# Patient Record
Sex: MALE | Race: WHITE | URBAN - METROPOLITAN AREA
[De-identification: names, ages, dates, MRNs, and addresses within clinical notes are randomized per-mention and may not be internally consistent; named-entity substitution may affect disease eponyms.]

---

## 2022-08-10 ENCOUNTER — HOSPITAL ENCOUNTER (EMERGENCY)
Facility: HOSPITAL | Age: 32
Discharge: HOME | End: 2022-08-10
Attending: STUDENT IN AN ORGANIZED HEALTH CARE EDUCATION/TRAINING PROGRAM
Payer: MEDICAID

## 2022-08-10 VITALS
HEART RATE: 94 BPM | BODY MASS INDEX: 19.71 KG/M2 | TEMPERATURE: 100 F | RESPIRATION RATE: 16 BRPM | WEIGHT: 130.06 LBS | DIASTOLIC BLOOD PRESSURE: 80 MMHG | HEIGHT: 68 IN | OXYGEN SATURATION: 98 % | SYSTOLIC BLOOD PRESSURE: 135 MMHG

## 2022-08-10 DIAGNOSIS — U07.1 COVID-19: Primary | ICD-10-CM

## 2022-08-10 LAB
C TRACH DNA SPEC QL NAA+PROBE: NOT DETECTED
FLUAV AG UPPER RESP QL IA.RAPID: NOT DETECTED
FLUBV AG UPPER RESP QL IA.RAPID: NOT DETECTED
HIV 1+2 AB+HIV1 P24 AG SERPL QL IA: NONREACTIVE
N GONORRHOEA DNA SPEC QL NAA+PROBE: NOT DETECTED
SARS-COV-2 RNA RESP QL NAA+PROBE: DETECTED
T PALLIDUM AB SER QL: NONREACTIVE

## 2022-08-10 PROCEDURE — 87636 SARSCOV2 & INF A&B AMP PRB: CPT | Performed by: PHYSICIAN ASSISTANT

## 2022-08-10 PROCEDURE — 87591 N.GONORRHOEAE DNA AMP PROB: CPT | Performed by: PHYSICIAN ASSISTANT

## 2022-08-10 PROCEDURE — 86780 TREPONEMA PALLIDUM: CPT | Performed by: PHYSICIAN ASSISTANT

## 2022-08-10 PROCEDURE — 87389 HIV-1 AG W/HIV-1&-2 AB AG IA: CPT | Performed by: PHYSICIAN ASSISTANT

## 2022-08-10 PROCEDURE — 63600175 PHARM REV CODE 636 W HCPCS: Performed by: PHYSICIAN ASSISTANT

## 2022-08-10 PROCEDURE — 99284 EMERGENCY DEPT VISIT MOD MDM: CPT | Mod: 25

## 2022-08-10 PROCEDURE — 87491 CHLMYD TRACH DNA AMP PROBE: CPT | Performed by: PHYSICIAN ASSISTANT

## 2022-08-10 PROCEDURE — 96372 THER/PROPH/DIAG INJ SC/IM: CPT | Performed by: PHYSICIAN ASSISTANT

## 2022-08-10 PROCEDURE — 36415 COLL VENOUS BLD VENIPUNCTURE: CPT | Performed by: PHYSICIAN ASSISTANT

## 2022-08-10 RX ORDER — INDOMETHACIN 50 MG/1
50 CAPSULE ORAL
Qty: 15 CAPSULE | Refills: 0 | Status: SHIPPED | OUTPATIENT
Start: 2022-08-10

## 2022-08-10 RX ORDER — KETOROLAC TROMETHAMINE 30 MG/ML
30 INJECTION, SOLUTION INTRAMUSCULAR; INTRAVENOUS
Status: COMPLETED | OUTPATIENT
Start: 2022-08-10 | End: 2022-08-10

## 2022-08-10 RX ORDER — PROMETHAZINE HYDROCHLORIDE AND DEXTROMETHORPHAN HYDROBROMIDE 6.25; 15 MG/5ML; MG/5ML
5 SYRUP ORAL EVERY 6 HOURS PRN
Qty: 100 ML | Refills: 0 | Status: SHIPPED | OUTPATIENT
Start: 2022-08-10 | End: 2022-08-15

## 2022-08-10 RX ADMIN — KETOROLAC TROMETHAMINE 30 MG: 30 INJECTION, SOLUTION INTRAMUSCULAR at 02:08

## 2022-08-10 NOTE — Clinical Note
"Carmine"Diana Mclean was seen and treated in our emergency department on 8/10/2022.     COVID-19 is present in our communities across the state. There is limited testing for COVID at this time, so not all patients can be tested. In this situation, your employee meets the following criteria:    Carmine Mclean has met the criteria for COVID-19 testing and has a POSITIVE result. He can return to work once they are asymptomatic for 24 hours without the use of fever reducing medications AND at least five days from the first positive result. A mask is recommended for 5 days post quarantine.     If you have any questions or concerns, or if I can be of further assistance, please do not hesitate to contact me.    Sincerely,              RN"

## 2022-08-10 NOTE — DISCHARGE INSTRUCTIONS
Report to Emergency Department if symptoms return or worsen; [facility] Within 1 to 2 days, It is important that you follow up with your primary care provider or specialist if indicated for further evaluation, workup, and treatment as necessary. The exam and treatment you received in Emergency Department was for an urgent problem and NOT INTENDED AS COMPLETE CARE. It is important that you FOLLOW UP with a doctor for ongoing care. If your symptoms become WORSE or you DO NOT IMPROVE and you are unable to reach your health care provider, you should RETURN to the Emergency Department. The Emergency Department provider has provided a PRELIMINARY INTERPRETATION of all your studies. A final interpretation may be done after you are discharged. If a change in your diagnosis or treatment is needed WE WILL CONTACT YOU. It is critical that we have a CURRENT PHONE NUMBER FOR YOU.

## 2022-08-10 NOTE — ED PROVIDER NOTES
Encounter Date: 8/10/2022       History     Chief Complaint   Patient presents with    COVID-19 Concerns     Reports cough, body aches, and clammy at home since yesterday.      33 yo M presents to ED c/o cough, body aches, fatigue & chills for past 2 days. Patient reports his father has similar symptoms & tested positive for COVID yesterday. Patient also requesting STD testing. Denies known STD exposure or symptoms. VSS on arrival w/ NAD.        Review of patient's allergies indicates:   Allergen Reactions    Acetaminophen      elevated liver enzymes    Bee venom protein (honey bee)      History reviewed. No pertinent past medical history.  Past Surgical History:   Procedure Laterality Date    TONSILLECTOMY       History reviewed. No pertinent family history.  Social History     Tobacco Use    Smoking status: Never Smoker    Smokeless tobacco: Never Used   Substance Use Topics    Alcohol use: Yes    Drug use: Never     Review of Systems   Constitutional: Positive for appetite change, chills and fatigue. Negative for diaphoresis, fever and unexpected weight change.   HENT: Positive for congestion, rhinorrhea and sinus pressure. Negative for ear discharge, ear pain and sinus pain.    Eyes: Negative for photophobia and visual disturbance.   Respiratory: Positive for cough. Negative for shortness of breath, wheezing and stridor.    Cardiovascular: Negative for chest pain and palpitations.   Gastrointestinal: Negative for abdominal pain, diarrhea, nausea and vomiting.   Endocrine: Negative for polydipsia, polyphagia and polyuria.   Genitourinary: Negative for dysuria, frequency, genital sores, hematuria, penile discharge, penile pain, scrotal swelling, testicular pain and urgency.   Musculoskeletal: Positive for arthralgias and myalgias. Negative for back pain, joint swelling, neck pain and neck stiffness.   Skin: Negative for pallor and rash.   Neurological: Negative for dizziness, syncope, weakness,  light-headedness, numbness and headaches.   Hematological: Negative for adenopathy.   Psychiatric/Behavioral: Negative for confusion.   All other systems reviewed and are negative.      Physical Exam     Initial Vitals [08/10/22 1218]   BP Pulse Resp Temp SpO2   135/80 94 16 100.2 °F (37.9 °C) 98 %      MAP       --         Physical Exam    Nursing note and vitals reviewed.  Constitutional: He appears well-developed and well-nourished. He is not diaphoretic. No distress.   HENT:   Head: Normocephalic and atraumatic.   Mouth/Throat: Oropharynx is clear and moist. No oropharyngeal exudate.   Eyes: Conjunctivae are normal. Pupils are equal, round, and reactive to light.   Neck: Neck supple.   Normal range of motion.  Cardiovascular: Normal rate, regular rhythm, normal heart sounds and intact distal pulses. Exam reveals no gallop and no friction rub.    No murmur heard.  Pulmonary/Chest: Breath sounds normal. No respiratory distress. He has no wheezes. He has no rhonchi. He has no rales.   Abdominal: Abdomen is soft. Bowel sounds are normal. He exhibits no distension. There is no abdominal tenderness. There is no rebound and no guarding.   Musculoskeletal:         General: No tenderness or edema. Normal range of motion.      Cervical back: Normal range of motion and neck supple.     Neurological: He is alert and oriented to person, place, and time. No cranial nerve deficit or sensory deficit.   Skin: Skin is warm and dry. Capillary refill takes less than 2 seconds. No erythema. No pallor.   Psychiatric: He has a normal mood and affect.         ED Course   Procedures  Labs Reviewed   COVID/FLU A&B PCR - Abnormal; Notable for the following components:       Result Value    SARS-CoV-2 PCR Detected (*)     All other components within normal limits   CHLAMYDIA/GONORRHOEAE(GC), PCR   SYPHILIS ANTIBODY (WITH REFLEX RPR)   HIV 1 / 2 ANTIBODY          Imaging Results    None          Medications   ketorolac injection 30 mg (30  mg Intramuscular Given 8/10/22 5303)     Medical Decision Making:   Clinical Tests:   Lab Tests: Ordered and Reviewed  COVID positive. Will discharge w/ meds for symptom relief. Instructed to check portal for results of STD testing & return to ED if any tests are positive. Strict ED precautions given.                      Clinical Impression:   Final diagnoses:  [U07.1] COVID-19 (Primary)          ED Disposition Condition    Discharge Stable        ED Prescriptions     Medication Sig Dispense Start Date End Date Auth. Provider    indomethacin (INDOCIN) 50 MG capsule Take 1 capsule (50 mg total) by mouth 3 (three) times daily with meals. 15 capsule 8/10/2022  WALT Zelaya    promethazine-dextromethorphan (PROMETHAZINE-DM) 6.25-15 mg/5 mL Syrp Take 5 mLs by mouth every 6 (six) hours as needed (cough). 100 mL 8/10/2022 8/15/2022 WALT Zelaya        Follow-up Information     Follow up With Specialties Details Why Contact Info Additional Information    [facility] Internal Medicine In 4 weeks  [address] [facility] [facility] [address] [phone] Internal Medicine Clinic Entrance #1    [facility] Emergency [facility] Emergency Medicine  If symptoms worsen [address] [facility] [address] [phone]            WALT Zelaya  08/10/22 2870

## 2022-08-10 NOTE — Clinical Note
"Carmine"Diana Mclean was seen and treated in our emergency department on 8/10/2022.     COVID-19 is present in our communities across the state. There is limited testing for COVID at this time, so not all patients can be tested. In this situation, your employee meets the following criteria:    Carmine Mclean has met the criteria for COVID-19 testing and has a POSITIVE result. He can return to work once they are asymptomatic for 24 hours without the use of fever reducing medications AND at least five days from the first positive result. A mask is recommended for 5 days post quarantine.     If you have any questions or concerns, or if I can be of further assistance, please do not hesitate to contact me.    Sincerely,             WALT Zelaya"

## 2023-06-23 ENCOUNTER — HOSPITAL ENCOUNTER (EMERGENCY)
Facility: HOSPITAL | Age: 33
Discharge: PSYCHIATRIC OR SUBSTANCE USE FACILITY | End: 2023-06-23
Attending: EMERGENCY MEDICINE
Payer: MEDICAID

## 2023-06-23 VITALS
WEIGHT: 132.25 LBS | OXYGEN SATURATION: 100 % | BODY MASS INDEX: 20.04 KG/M2 | SYSTOLIC BLOOD PRESSURE: 158 MMHG | TEMPERATURE: 98 F | HEART RATE: 60 BPM | RESPIRATION RATE: 18 BRPM | DIASTOLIC BLOOD PRESSURE: 98 MMHG | HEIGHT: 68 IN

## 2023-06-23 DIAGNOSIS — F32.A DEPRESSION WITH SUICIDAL IDEATION: ICD-10-CM

## 2023-06-23 DIAGNOSIS — R44.0 AUDITORY HALLUCINATIONS: ICD-10-CM

## 2023-06-23 DIAGNOSIS — R45.851 DEPRESSION WITH SUICIDAL IDEATION: ICD-10-CM

## 2023-06-23 DIAGNOSIS — Z00.8 MEDICAL CLEARANCE FOR PSYCHIATRIC ADMISSION: Primary | ICD-10-CM

## 2023-06-23 LAB
ALBUMIN SERPL-MCNC: 4.5 G/DL (ref 3.5–5)
ALBUMIN/GLOB SERPL: 1.6 RATIO (ref 1.1–2)
ALP SERPL-CCNC: 59 UNIT/L (ref 40–150)
ALT SERPL-CCNC: 27 UNIT/L (ref 0–55)
AMPHET UR QL SCN: NEGATIVE
APAP SERPL-MCNC: <17.4 UG/ML (ref 17.4–30)
APPEARANCE UR: CLEAR
AST SERPL-CCNC: 26 UNIT/L (ref 5–34)
BACTERIA #/AREA URNS AUTO: ABNORMAL /HPF
BARBITURATE SCN PRESENT UR: NEGATIVE
BASOPHILS # BLD AUTO: 0.07 X10(3)/MCL
BASOPHILS NFR BLD AUTO: 0.9 %
BENZODIAZ UR QL SCN: NEGATIVE
BILIRUB UR QL STRIP.AUTO: NEGATIVE MG/DL
BILIRUBIN DIRECT+TOT PNL SERPL-MCNC: 0.8 MG/DL
BUN SERPL-MCNC: 17.9 MG/DL (ref 8.9–20.6)
CALCIUM SERPL-MCNC: 9.7 MG/DL (ref 8.4–10.2)
CANNABINOIDS UR QL SCN: POSITIVE
CHLORIDE SERPL-SCNC: 104 MMOL/L (ref 98–107)
CO2 SERPL-SCNC: 27 MMOL/L (ref 22–29)
COCAINE UR QL SCN: NEGATIVE
COLOR UR: ABNORMAL
CREAT SERPL-MCNC: 1.06 MG/DL (ref 0.73–1.18)
EOSINOPHIL # BLD AUTO: 0.42 X10(3)/MCL (ref 0–0.9)
EOSINOPHIL NFR BLD AUTO: 5.5 %
ERYTHROCYTE [DISTWIDTH] IN BLOOD BY AUTOMATED COUNT: 11.8 % (ref 11.5–17)
ETHANOL SERPL-MCNC: <10 MG/DL
FENTANYL UR QL SCN: NEGATIVE
GFR SERPLBLD CREATININE-BSD FMLA CKD-EPI: >60 MLS/MIN/1.73/M2
GLOBULIN SER-MCNC: 2.9 GM/DL (ref 2.4–3.5)
GLUCOSE SERPL-MCNC: 121 MG/DL (ref 74–100)
GLUCOSE UR QL STRIP.AUTO: NORMAL MG/DL
HCT VFR BLD AUTO: 47.3 % (ref 42–52)
HGB BLD-MCNC: 15.7 G/DL (ref 14–18)
HYALINE CASTS #/AREA URNS LPF: ABNORMAL /LPF
IMM GRANULOCYTES # BLD AUTO: 0.02 X10(3)/MCL (ref 0–0.04)
IMM GRANULOCYTES NFR BLD AUTO: 0.3 %
KETONES UR QL STRIP.AUTO: NEGATIVE MG/DL
LEUKOCYTE ESTERASE UR QL STRIP.AUTO: NEGATIVE UNIT/L
LYMPHOCYTES # BLD AUTO: 1.46 X10(3)/MCL (ref 0.6–4.6)
LYMPHOCYTES NFR BLD AUTO: 19.2 %
MCH RBC QN AUTO: 30.8 PG (ref 27–31)
MCHC RBC AUTO-ENTMCNC: 33.2 G/DL (ref 33–36)
MCV RBC AUTO: 92.7 FL (ref 80–94)
MDMA UR QL SCN: NEGATIVE
MONOCYTES # BLD AUTO: 0.58 X10(3)/MCL (ref 0.1–1.3)
MONOCYTES NFR BLD AUTO: 7.6 %
MUCOUS THREADS URNS QL MICRO: ABNORMAL /LPF
NEUTROPHILS # BLD AUTO: 5.07 X10(3)/MCL (ref 2.1–9.2)
NEUTROPHILS NFR BLD AUTO: 66.5 %
NITRITE UR QL STRIP.AUTO: NEGATIVE
NRBC BLD AUTO-RTO: 0 %
OPIATES UR QL SCN: NEGATIVE
PCP UR QL: NEGATIVE
PH UR STRIP.AUTO: 5.5 [PH]
PH UR: 5.5 [PH] (ref 3–11)
PLATELET # BLD AUTO: 263 X10(3)/MCL (ref 130–400)
PMV BLD AUTO: 10.3 FL (ref 7.4–10.4)
POTASSIUM SERPL-SCNC: 4.5 MMOL/L (ref 3.5–5.1)
PROT SERPL-MCNC: 7.4 GM/DL (ref 6.4–8.3)
PROT UR QL STRIP.AUTO: NEGATIVE MG/DL
RBC # BLD AUTO: 5.1 X10(6)/MCL (ref 4.7–6.1)
RBC #/AREA URNS AUTO: ABNORMAL /HPF
RBC UR QL AUTO: ABNORMAL UNIT/L
SARS-COV-2 RDRP RESP QL NAA+PROBE: NEGATIVE
SODIUM SERPL-SCNC: 140 MMOL/L (ref 136–145)
SP GR UR STRIP.AUTO: 1.02
SQUAMOUS #/AREA URNS LPF: ABNORMAL /HPF
T4 FREE SERPL-MCNC: 0.87 NG/DL (ref 0.7–1.48)
TSH SERPL-ACNC: 0.3 UIU/ML (ref 0.35–4.94)
UROBILINOGEN UR STRIP-ACNC: NORMAL MG/DL
WBC # SPEC AUTO: 7.62 X10(3)/MCL (ref 4.5–11.5)
WBC #/AREA URNS AUTO: ABNORMAL /HPF

## 2023-06-23 PROCEDURE — 80053 COMPREHEN METABOLIC PANEL: CPT | Performed by: EMERGENCY MEDICINE

## 2023-06-23 PROCEDURE — 87635 SARS-COV-2 COVID-19 AMP PRB: CPT | Performed by: EMERGENCY MEDICINE

## 2023-06-23 PROCEDURE — 80307 DRUG TEST PRSMV CHEM ANLYZR: CPT | Performed by: EMERGENCY MEDICINE

## 2023-06-23 PROCEDURE — 84443 ASSAY THYROID STIM HORMONE: CPT | Performed by: EMERGENCY MEDICINE

## 2023-06-23 PROCEDURE — 82077 ASSAY SPEC XCP UR&BREATH IA: CPT | Performed by: EMERGENCY MEDICINE

## 2023-06-23 PROCEDURE — 84439 ASSAY OF FREE THYROXINE: CPT | Performed by: EMERGENCY MEDICINE

## 2023-06-23 PROCEDURE — 63600175 PHARM REV CODE 636 W HCPCS: Performed by: EMERGENCY MEDICINE

## 2023-06-23 PROCEDURE — 96372 THER/PROPH/DIAG INJ SC/IM: CPT | Performed by: EMERGENCY MEDICINE

## 2023-06-23 PROCEDURE — 81001 URINALYSIS AUTO W/SCOPE: CPT | Performed by: EMERGENCY MEDICINE

## 2023-06-23 PROCEDURE — 85025 COMPLETE CBC W/AUTO DIFF WBC: CPT | Performed by: EMERGENCY MEDICINE

## 2023-06-23 PROCEDURE — 99285 EMERGENCY DEPT VISIT HI MDM: CPT

## 2023-06-23 PROCEDURE — 80143 DRUG ASSAY ACETAMINOPHEN: CPT | Performed by: EMERGENCY MEDICINE

## 2023-06-23 RX ORDER — LORAZEPAM 2 MG/ML
2 INJECTION INTRAMUSCULAR
Status: COMPLETED | OUTPATIENT
Start: 2023-06-23 | End: 2023-06-23

## 2023-06-23 RX ORDER — DROPERIDOL 2.5 MG/ML
5 INJECTION, SOLUTION INTRAMUSCULAR; INTRAVENOUS ONCE
Status: COMPLETED | OUTPATIENT
Start: 2023-06-23 | End: 2023-06-23

## 2023-06-23 RX ORDER — MIDAZOLAM HYDROCHLORIDE 5 MG/ML
5 INJECTION INTRAMUSCULAR; INTRAVENOUS ONCE
Status: DISCONTINUED | OUTPATIENT
Start: 2023-06-23 | End: 2023-06-23

## 2023-06-23 RX ADMIN — DROPERIDOL 5 MG: 2.5 INJECTION, SOLUTION INTRAMUSCULAR; INTRAVENOUS at 04:06

## 2023-06-23 RX ADMIN — LORAZEPAM 2 MG: 2 INJECTION INTRAMUSCULAR; INTRAVENOUS at 04:06

## 2023-06-23 NOTE — ED PROVIDER NOTES
ED PROVIDER NOTE  6/23/2023    CHIEF COMPLAINT:   Chief Complaint   Patient presents with    Psychiatric Evaluation     C/o paranoia and not sleeping. States meds not helping.        HISTORY OF PRESENT ILLNESS:   Carmine Mclean is a 33 y.o. male who presents with chief complaint Mental health evaluation.  Patient reports having worsening auditory hallucinations of voices telling him to kill himself or that they are going to hurt him.  He states that he was not been taking his Seroquel.  He has known history of schizophrenia.  He presents in law enforcement custody after OPC was filed for him after message itching a friend stating that he was going to kill himself by overdose.  He apparently has an upcoming court date and had stated that he was going to kill himself before his court date so that he would not have to go to assisted.    The history is provided by the patient and the police.       REVIEW OF SYSTEMS: as noted in the HPI.  NURSING NOTES REVIEWED      PAST MEDICAL/SURGICAL HISTORY: History reviewed. No pertinent past medical history.   Past Surgical History:   Procedure Laterality Date    TONSILLECTOMY         FAMILY HISTORY: History reviewed. No pertinent family history.    SOCIAL HISTORY:   Social History     Tobacco Use    Smoking status: Never    Smokeless tobacco: Never   Substance Use Topics    Alcohol use: Yes    Drug use: Never       ALLERGIES:   Review of patient's allergies indicates:   Allergen Reactions    Acetaminophen      elevated liver enzymes    Bee venom protein (honey bee)        PHYSICAL EXAM:  Initial Vitals [06/23/23 1351]   BP Pulse Resp Temp SpO2   (!) 167/104 61 20 98.2 °F (36.8 °C) 100 %      MAP       --         Physical Exam    Nursing note and vitals reviewed.  Constitutional: He appears well-developed and well-nourished. No distress.   HENT:   Head: Normocephalic and atraumatic.   Nose: Nose normal.   Mouth/Throat: Oropharynx is clear and moist and mucous membranes are  normal.   Eyes: Conjunctivae and EOM are normal. Pupils are equal, round, and reactive to light.   Neck: Neck supple. No tracheal deviation present.   Cardiovascular:  Normal rate, regular rhythm, normal heart sounds, intact distal pulses and normal pulses.           Pulmonary/Chest: Effort normal and breath sounds normal. No respiratory distress.   Abdominal: Abdomen is soft. There is no abdominal tenderness. There is no rebound and no guarding.   Musculoskeletal:         General: Normal range of motion.      Cervical back: Neck supple.     Neurological: He is alert and oriented to person, place, and time. GCS eye subscore is 4. GCS verbal subscore is 5. GCS motor subscore is 6.   CN II-XII intact. Moves all extremities. No gross sensory or motor deficits.   Skin: Skin is warm, dry and intact.   Psychiatric: His speech is normal and behavior is normal. Cognition and memory are normal. He expresses inappropriate judgment. He exhibits a depressed mood. He expresses suicidal ideation. He expresses suicidal plans.       RESULTS:  Labs Reviewed   COMPREHENSIVE METABOLIC PANEL - Abnormal; Notable for the following components:       Result Value    Glucose Level 121 (*)     All other components within normal limits   TSH - Abnormal; Notable for the following components:    Thyroid Stimulating Hormone 0.304 (*)     All other components within normal limits   URINALYSIS, REFLEX TO URINE CULTURE - Abnormal; Notable for the following components:    Blood, UA Trace (*)     Bacteria, UA Trace (*)     Mucous, UA Trace (*)     All other components within normal limits   DRUG SCREEN, URINE (BEAKER) - Abnormal; Notable for the following components:    Cannabinoids, Urine Positive (*)     All other components within normal limits    Narrative:     Cut off concentrations:    Amphetamines - 1000 ng/ml  Barbiturates - 200 ng/ml  Benzodiazepine - 200 ng/ml  Cannabinoids (THC) - 50 ng/ml  Cocaine - 300 ng/ml  Fentanyl - 1.0 ng/ml  MDMA -  500 ng/ml  Opiates - 300 ng/ml   Phencyclidine (PCP) - 25 ng/ml    Specimen submitted for drug analysis and tested for pH and specific gravity in order to evaluate sample integrity. Suspect tampering if specific gravity is <1.003 and/or pH is not within the range of 4.5 - 8.0  False negatives may result form substances such as bleach added to urine.  False positives may result for the presence of a substance with similar chemical structure to the drug or its metabolite.    This test provides only a PRELIMINARY analytical test result. A more specific alternate chemical method must be used in order to obtain a confirmed analytical result. Gas chromatography/mass spectrometry (GC/MS) is the preferred confirmatory method. Other chemical confirmation methods are available. Clinical consideration and professional judgement should be applied to any drug of abuse test result, particularly when preliminary positive results are used.    Positive results will be confirmed only at the physicians request. Unconfirmed screening results are to be used only for medical purposes (treatment).        ACETAMINOPHEN LEVEL - Abnormal; Notable for the following components:    Acetaminophen Level <17.4 (*)     All other components within normal limits   ALCOHOL,MEDICAL (ETHANOL) - Normal   SARS-COV-2 RNA AMPLIFICATION, QUAL - Normal    Narrative:     The IDNOW COVID-19 assay is a rapid molecular in vitro diagnostic test utilizing an isothermal nucleic acid amplification technology intended for the qualitative detection of nucleic acid from the SARS-CoV-2 viral RNA in direct nasal, nasopharyngeal or throat swabs from individuals who are suspected of COVID-19 by their healthcare provider.   T4, FREE - Normal   CBC W/ AUTO DIFFERENTIAL    Narrative:     The following orders were created for panel order CBC auto differential.  Procedure                               Abnormality         Status                     ---------                                -----------         ------                     CBC with Differential[[id number]]                            Final result                 Please view results for these tests on the individual orders.   CBC WITH DIFFERENTIAL   SARS CORONAVIRUS 2 ANTIGEN POCT, MANUAL READ     Imaging Results    None         PROCEDURES:  Procedures    ECG:       ED COURSE AND MEDICAL DECISION MAKING:  Medications   droPERidol injection 5 mg (5 mg Intramuscular Given 6/23/23 1630)   LORazepam injection 2 mg (2 mg Intramuscular Given 6/23/23 1630)           Medical Decision Making  33-year-old male presents via law enforcement for mental health evaluation.  Patient does endorse having increasing auditory hallucinations in his not been taking his Seroquel.  He sent messages to a friend of his stating he was going to kill himself by overdosing before his upcoming court date apparently concerned about being incarcerated.  Placed on involuntary commitment for safety with suicide precautions.  Routine labs obtained to medically cleared for psychiatric admission.  CBC and CMP unremarkable.  TSH 0.304 with normal free T4.  UDS positive for cannabinoids.  He did begin to have some increasing agitation responding to internal stimuli while in the ED so he was given IM droperidol and Ativan.  He is medically cleared for psychiatric admission.    Amount and/or Complexity of Data Reviewed  Independent Historian: EMS     Details: Law enforcement provided photocopies of text messages with friend stating he was going to kill himself.  Labs: ordered. Decision-making details documented in ED Course.    Risk  Parenteral controlled substances.  Decision regarding hospitalization.        CLINICAL IMPRESSION:  1. Medical clearance for psychiatric admission    2. Depression with suicidal ideation    3. Auditory hallucinations        DISPOSITION:   ED Disposition Condition    Transfer to Psych Facility Stable            ED Prescriptions    None        Follow-up Information    None            Herbie Duenas,   06/23/23 0989

## 2023-07-28 ENCOUNTER — HOSPITAL ENCOUNTER (EMERGENCY)
Facility: HOSPITAL | Age: 33
Discharge: HOME | End: 2023-07-28
Attending: EMERGENCY MEDICINE
Payer: MEDICAID

## 2023-07-28 VITALS
WEIGHT: 134.94 LBS | TEMPERATURE: 98 F | DIASTOLIC BLOOD PRESSURE: 76 MMHG | RESPIRATION RATE: 18 BRPM | HEIGHT: 68 IN | BODY MASS INDEX: 20.45 KG/M2 | OXYGEN SATURATION: 98 % | HEART RATE: 65 BPM | SYSTOLIC BLOOD PRESSURE: 125 MMHG

## 2023-07-28 DIAGNOSIS — H61.20 IMPACTED CERUMEN, UNSPECIFIED LATERALITY: Primary | ICD-10-CM

## 2023-07-28 PROCEDURE — 99282 EMERGENCY DEPT VISIT SF MDM: CPT

## 2023-07-28 NOTE — ED PROVIDER NOTES
"Encounter Date: 7/28/2023     History     Chief Complaint   Patient presents with    Hearing Problem     Bilateral muffled hearing "clogged" x1 week. No distress.      Patient with no significant pmhx presents today c/o bilateral ear fullness and decreased hearing bilaterally for 1 week. He says this has happened in the past and has had to have ears cleaned out. No exacerbating or relieving factors. He denies any other associated symptoms.     The history is provided by the patient. No  was used.   Review of patient's allergies indicates:   Allergen Reactions    Acetaminophen      elevated liver enzymes    Bee venom protein (honey bee)      No past medical history on file.  Past Surgical History:   Procedure Laterality Date    TONSILLECTOMY       No family history on file.  Social History     Tobacco Use    Smoking status: Never    Smokeless tobacco: Never   Substance Use Topics    Alcohol use: Yes    Drug use: Never     Review of Systems   Constitutional:  Negative for chills and fever.   HENT:  Positive for hearing loss. Negative for ear discharge, ear pain, postnasal drip, rhinorrhea, sinus pressure, sinus pain and sore throat.    Respiratory:  Negative for chest tightness and shortness of breath.    Cardiovascular:  Negative for chest pain.   Gastrointestinal:  Negative for abdominal pain, constipation, diarrhea, nausea and vomiting.   Genitourinary:  Negative for flank pain.   Musculoskeletal:  Negative for arthralgias and myalgias.   Skin:  Negative for rash.   Neurological:  Negative for syncope, light-headedness and headaches.   All other systems reviewed and are negative.    Physical Exam     Initial Vitals [07/28/23 0957]   BP Pulse Resp Temp SpO2   125/76 65 18 97.7 °F (36.5 °C) 98 %      MAP       --         Physical Exam    Nursing note and vitals reviewed.  Constitutional: He appears well-developed and well-nourished. He is not diaphoretic. No distress.   HENT:   Head: Normocephalic " and atraumatic.   Mouth/Throat: Oropharynx is clear and moist. No oropharyngeal exudate.   Bilateral cerumen impactions. Unable to visualize TM bilaterally.   Eyes: Conjunctivae and EOM are normal.   Neck: Neck supple.   Normal range of motion.  Cardiovascular:  Normal rate, regular rhythm, normal heart sounds and intact distal pulses.           Pulmonary/Chest: Breath sounds normal. No respiratory distress.   Abdominal: Abdomen is soft. He exhibits no distension. There is no abdominal tenderness. There is no rebound and no guarding.   Musculoskeletal:         General: No edema.      Cervical back: Normal range of motion and neck supple.     Neurological: He is alert and oriented to person, place, and time. GCS score is 15. GCS eye subscore is 4. GCS verbal subscore is 5. GCS motor subscore is 6.   Skin: Skin is warm and dry. Capillary refill takes less than 2 seconds. No rash noted.   Psychiatric: He has a normal mood and affect.       ED Course   Procedures  Labs Reviewed - No data to display       Imaging Results    None          Medications - No data to display  Medical Decision Making:   History:   Old Records Summarized: records from previous admission(s).  Initial Assessment:   Patient with decreased hearing bilaterally. Hx of cerumen impactions.   Differential Diagnosis:   Cerumen impaction, otitis externa, otitis media, eustachian tube dysfunction  Patient is non-toxic appearing. Vitals stable. Referral sent to ENT clinic for f/u. Stable for discharge. ED precautions given.                         Clinical Impression:   Final diagnoses:  [H61.20] Impacted cerumen, unspecified laterality (Primary)        ED Disposition Condition    Discharge Stable          ED Prescriptions       Medication Sig Dispense Start Date End Date Auth. Provider    carbamide peroxide (DEBROX) 6.5 % otic solution Place 5 drops into both ears 2 (two) times daily. for 4 days 15 mL 7/28/2023 8/1/2023 WALT Grove           Follow-up Information       Follow up With Specialties Details Why Contact Info Additional Information    [facility] Emergency Medicine  If symptoms worsen return to ED immediately [address] [facility] [address] [phone]     [facility], Entrance 6 Otolaryngology In 1 week  [address] [facility] [id number] [phone] [facility] - ENT,  Entrance #6 Please sign with the  when you arrive.             WALT Grove  07/28/23 1019

## 2024-08-25 ENCOUNTER — HOSPITAL ENCOUNTER (EMERGENCY)
Facility: HOSPITAL | Age: 34
Discharge: HOME | End: 2024-08-25
Attending: EMERGENCY MEDICINE

## 2024-08-25 VITALS
TEMPERATURE: 98 F | OXYGEN SATURATION: 98 % | SYSTOLIC BLOOD PRESSURE: 123 MMHG | RESPIRATION RATE: 18 BRPM | DIASTOLIC BLOOD PRESSURE: 72 MMHG | HEIGHT: 67 IN | WEIGHT: 127.56 LBS | HEART RATE: 66 BPM | BODY MASS INDEX: 20.02 KG/M2

## 2024-08-25 DIAGNOSIS — L03.90 CELLULITIS, UNSPECIFIED CELLULITIS SITE: Primary | ICD-10-CM

## 2024-08-25 PROCEDURE — 25000003 PHARM REV CODE 250: Performed by: EMERGENCY MEDICINE

## 2024-08-25 PROCEDURE — 99283 EMERGENCY DEPT VISIT LOW MDM: CPT

## 2024-08-25 RX ORDER — AMOXICILLIN AND CLAVULANATE POTASSIUM 875; 125 MG/1; MG/1
1 TABLET, FILM COATED ORAL 2 TIMES DAILY
Qty: 14 TABLET | Refills: 0 | Status: SHIPPED | OUTPATIENT
Start: 2024-08-25

## 2024-08-25 RX ORDER — AMOXICILLIN AND CLAVULANATE POTASSIUM 875; 125 MG/1; MG/1
1 TABLET, FILM COATED ORAL
Status: COMPLETED | OUTPATIENT
Start: 2024-08-25 | End: 2024-08-25

## 2024-08-25 RX ADMIN — AMOXICILLIN AND CLAVULANATE POTASSIUM 1 TABLET: 875; 125 TABLET, FILM COATED ORAL at 03:08

## 2024-08-25 NOTE — ED PROVIDER NOTES
Encounter Date: 8/25/2024       History     Chief Complaint   Patient presents with    Abscess     Patient reports noticing an abscess to the left anterior thigh yesterday. Raised reddened area noted on inspection. He denies having any fevers or chills.      Small area of erythema left anterior thigh; no fevers, chills, nausea, vomiting.        Review of patient's allergies indicates:   Allergen Reactions    Acetaminophen      elevated liver enzymes    Bee venom protein (honey bee)     Wasp venom Anxiety     History reviewed. No pertinent past medical history.  Past Surgical History:   Procedure Laterality Date    TONSILLECTOMY       No family history on file.  Social History     Tobacco Use    Smoking status: Never    Smokeless tobacco: Never   Substance Use Topics    Alcohol use: Yes    Drug use: Never     Review of Systems    Physical Exam     Initial Vitals [08/25/24 0256]   BP Pulse Resp Temp SpO2   111/67 61 18 97.5 °F (36.4 °C) 97 %      MAP       --         Physical Exam    Nursing note and vitals reviewed.  Constitutional: He appears well-developed and well-nourished. He is not diaphoretic. No distress.   HENT:   Head: Normocephalic and atraumatic.   Eyes: EOM are normal. Pupils are equal, round, and reactive to light. Right eye exhibits no discharge. Left eye exhibits no discharge.   Neck: Neck supple. No thyromegaly present. No tracheal deviation present. No JVD present.   Normal range of motion.  Cardiovascular:  Normal rate, regular rhythm, normal heart sounds and intact distal pulses.           No murmur heard.  Pulmonary/Chest: Breath sounds normal. No stridor. No respiratory distress. He has no wheezes. He has no rhonchi. He has no rales.   Abdominal: Abdomen is soft. He exhibits no distension. There is no abdominal tenderness. There is no rebound and no guarding.   Musculoskeletal:         General: No tenderness or edema. Normal range of motion.      Cervical back: Normal range of motion and neck  supple.     Neurological: He is alert and oriented to person, place, and time. He has normal strength. No cranial nerve deficit. GCS score is 15. GCS eye subscore is 4. GCS verbal subscore is 5. GCS motor subscore is 6.   Skin: Skin is dry. Capillary refill takes less than 2 seconds. No rash and no abscess noted. There is erythema. No pallor.   Quite small area of cellulitis left anterior thigh; minimal induration, no fluctuance, no drainage;   Psychiatric: He has a normal mood and affect. His behavior is normal. Judgment and thought content normal.         ED Course   Procedures  Labs Reviewed - No data to display       Imaging Results    None          Medications   amoxicillin-clavulanate 875-125mg per tablet 1 tablet (has no administration in time range)     Medical Decision Making  Risk  Prescription drug management.                                      Clinical Impression:  Final diagnoses:  [L03.90] Cellulitis, unspecified cellulitis site (Primary)          ED Disposition Condition    Discharge Stable          ED Prescriptions       Medication Sig Dispense Start Date End Date Auth. Provider    amoxicillin-clavulanate 875-125mg (AUGMENTIN) 875-125 mg per tablet Take 1 tablet by mouth 2 (two) times daily. 14 tablet 8/25/2024 -- Otilio Mckeon MD          Follow-up Information       Follow up With Specialties Details Why Contact Info    [facility] Emergency Medicine  As needed, If symptoms worsen [address] [facility] [address] [phone]             Otilio Mckeon MD  08/25/24 7894

## 2024-08-25 NOTE — Clinical Note
"Carmine Mclean (Derrick) was seen and treated in our emergency department on 8/25/2024.  He may return to work on 08/26/2024.       If you have any questions or concerns, please don't hesitate to call.      Rafiq MELISSA    "

## 2024-11-26 ENCOUNTER — HOSPITAL ENCOUNTER (EMERGENCY)
Facility: HOSPITAL | Age: 34
Discharge: HOME | End: 2024-11-26
Attending: STUDENT IN AN ORGANIZED HEALTH CARE EDUCATION/TRAINING PROGRAM

## 2024-11-26 VITALS
HEART RATE: 76 BPM | SYSTOLIC BLOOD PRESSURE: 179 MMHG | DIASTOLIC BLOOD PRESSURE: 99 MMHG | BODY MASS INDEX: 18.71 KG/M2 | RESPIRATION RATE: 16 BRPM | WEIGHT: 123.44 LBS | OXYGEN SATURATION: 100 % | TEMPERATURE: 98 F | HEIGHT: 68 IN

## 2024-11-26 DIAGNOSIS — Z00.8 MEDICAL CLEARANCE FOR PSYCHIATRIC ADMISSION: Primary | ICD-10-CM

## 2024-11-26 DIAGNOSIS — F23 ACUTE PSYCHOSIS: ICD-10-CM

## 2024-11-26 DIAGNOSIS — R45.850 HOMICIDAL IDEATION: ICD-10-CM

## 2024-11-26 LAB
ALBUMIN SERPL-MCNC: 4.4 G/DL (ref 3.5–5)
ALBUMIN/GLOB SERPL: 1.8 RATIO (ref 1.1–2)
ALP SERPL-CCNC: 51 UNIT/L (ref 40–150)
ALT SERPL-CCNC: 28 UNIT/L (ref 0–55)
AMPHET UR QL SCN: NEGATIVE
ANION GAP SERPL CALC-SCNC: 6 MEQ/L
APAP SERPL-MCNC: <3 UG/ML (ref 10–30)
AST SERPL-CCNC: 42 UNIT/L (ref 5–34)
BACTERIA #/AREA URNS AUTO: NORMAL /HPF
BARBITURATE SCN PRESENT UR: NEGATIVE
BASOPHILS # BLD AUTO: 0.08 X10(3)/MCL
BASOPHILS NFR BLD AUTO: 0.9 %
BENZODIAZ UR QL SCN: NEGATIVE
BILIRUB SERPL-MCNC: 0.7 MG/DL
BILIRUB UR QL STRIP.AUTO: NEGATIVE
BUN SERPL-MCNC: 31.8 MG/DL (ref 8.9–20.6)
CALCIUM SERPL-MCNC: 9.6 MG/DL (ref 8.4–10.2)
CANNABINOIDS UR QL SCN: POSITIVE
CHLORIDE SERPL-SCNC: 101 MMOL/L (ref 98–107)
CLARITY UR: CLEAR
CO2 SERPL-SCNC: 30 MMOL/L (ref 22–29)
COCAINE UR QL SCN: NEGATIVE
COLOR UR AUTO: NORMAL
CREAT SERPL-MCNC: 1.23 MG/DL (ref 0.72–1.25)
CREAT/UREA NIT SERPL: 26
EOSINOPHIL # BLD AUTO: 0.38 X10(3)/MCL (ref 0–0.9)
EOSINOPHIL NFR BLD AUTO: 4.2 %
ERYTHROCYTE [DISTWIDTH] IN BLOOD BY AUTOMATED COUNT: 11.8 % (ref 11.5–17)
ETHANOL SERPL-MCNC: <10 MG/DL
FENTANYL UR QL SCN: NEGATIVE
GFR SERPLBLD CREATININE-BSD FMLA CKD-EPI: >60 ML/MIN/1.73/M2
GLOBULIN SER-MCNC: 2.5 GM/DL (ref 2.4–3.5)
GLUCOSE SERPL-MCNC: 112 MG/DL (ref 74–100)
GLUCOSE UR QL STRIP: NORMAL
HCT VFR BLD AUTO: 44.8 % (ref 42–52)
HGB BLD-MCNC: 15.7 G/DL (ref 14–18)
HGB UR QL STRIP: NEGATIVE
HOLD SPECIMEN: NORMAL
HYALINE CASTS #/AREA URNS LPF: NORMAL /LPF
IMM GRANULOCYTES # BLD AUTO: 0.02 X10(3)/MCL (ref 0–0.04)
IMM GRANULOCYTES NFR BLD AUTO: 0.2 %
KETONES UR QL STRIP: NEGATIVE
LEUKOCYTE ESTERASE UR QL STRIP: NEGATIVE
LYMPHOCYTES # BLD AUTO: 1.61 X10(3)/MCL (ref 0.6–4.6)
LYMPHOCYTES NFR BLD AUTO: 18 %
MCH RBC QN AUTO: 31.1 PG (ref 27–31)
MCHC RBC AUTO-ENTMCNC: 35 G/DL (ref 33–36)
MCV RBC AUTO: 88.7 FL (ref 80–94)
MDMA UR QL SCN: NEGATIVE
MONOCYTES # BLD AUTO: 0.66 X10(3)/MCL (ref 0.1–1.3)
MONOCYTES NFR BLD AUTO: 7.4 %
NEUTROPHILS # BLD AUTO: 6.2 X10(3)/MCL (ref 2.1–9.2)
NEUTROPHILS NFR BLD AUTO: 69.3 %
NITRITE UR QL STRIP: NEGATIVE
NRBC BLD AUTO-RTO: 0 %
OPIATES UR QL SCN: NEGATIVE
PCP UR QL: NEGATIVE
PH UR STRIP: 6.5 [PH]
PH UR: 6.5 [PH] (ref 3–11)
PLATELET # BLD AUTO: 313 X10(3)/MCL (ref 130–400)
PMV BLD AUTO: 9.6 FL (ref 7.4–10.4)
POTASSIUM SERPL-SCNC: 4 MMOL/L (ref 3.5–5.1)
PROT SERPL-MCNC: 6.9 GM/DL (ref 6.4–8.3)
PROT UR QL STRIP: NEGATIVE
RBC # BLD AUTO: 5.05 X10(6)/MCL (ref 4.7–6.1)
RBC #/AREA URNS AUTO: NORMAL /HPF
SODIUM SERPL-SCNC: 137 MMOL/L (ref 136–145)
SP GR UR STRIP.AUTO: 1.02 (ref 1–1.03)
SPECIFIC GRAVITY, URINE AUTO (.000) (OHS): 1.02 (ref 1–1.03)
SQUAMOUS #/AREA URNS LPF: NORMAL /HPF
TSH SERPL-ACNC: 0.36 UIU/ML (ref 0.35–4.94)
UROBILINOGEN UR STRIP-ACNC: NORMAL
WBC # BLD AUTO: 8.95 X10(3)/MCL (ref 4.5–11.5)
WBC #/AREA URNS AUTO: NORMAL /HPF

## 2024-11-26 PROCEDURE — 80307 DRUG TEST PRSMV CHEM ANLYZR: CPT | Performed by: STUDENT IN AN ORGANIZED HEALTH CARE EDUCATION/TRAINING PROGRAM

## 2024-11-26 PROCEDURE — 81001 URINALYSIS AUTO W/SCOPE: CPT | Performed by: STUDENT IN AN ORGANIZED HEALTH CARE EDUCATION/TRAINING PROGRAM

## 2024-11-26 PROCEDURE — 84443 ASSAY THYROID STIM HORMONE: CPT | Performed by: STUDENT IN AN ORGANIZED HEALTH CARE EDUCATION/TRAINING PROGRAM

## 2024-11-26 PROCEDURE — 99285 EMERGENCY DEPT VISIT HI MDM: CPT

## 2024-11-26 PROCEDURE — 85025 COMPLETE CBC W/AUTO DIFF WBC: CPT | Performed by: STUDENT IN AN ORGANIZED HEALTH CARE EDUCATION/TRAINING PROGRAM

## 2024-11-26 PROCEDURE — 80053 COMPREHEN METABOLIC PANEL: CPT | Performed by: STUDENT IN AN ORGANIZED HEALTH CARE EDUCATION/TRAINING PROGRAM

## 2024-11-26 PROCEDURE — 82077 ASSAY SPEC XCP UR&BREATH IA: CPT | Performed by: STUDENT IN AN ORGANIZED HEALTH CARE EDUCATION/TRAINING PROGRAM

## 2024-11-26 PROCEDURE — 80143 DRUG ASSAY ACETAMINOPHEN: CPT | Performed by: STUDENT IN AN ORGANIZED HEALTH CARE EDUCATION/TRAINING PROGRAM

## 2024-11-26 NOTE — ED PROVIDER NOTES
Encounter Date: 11/26/2024       History     Chief Complaint   Patient presents with    Psychiatric Evaluation     Patient here for mental eval; denies SI/HI; admits to /AH     Patient presents to the emergency department for psychiatric evaluation.  Reports he has been hearing voices and seeing images of dead family members.  Apparently today he also stated that he was going to get a gun and shoot everybody at his place of work.  Does have a history of psychosis, not currently on medications.  Denies any suicidal ideations.    The history is provided by the patient and the police.     Review of patient's allergies indicates:   Allergen Reactions    Acetaminophen      elevated liver enzymes    Bee venom protein (honey bee)     Wasp venom Anxiety     Past Medical History:   Diagnosis Date    Asthma      Past Surgical History:   Procedure Laterality Date    TONSILLECTOMY       No family history on file.  Social History     Tobacco Use    Smoking status: Never    Smokeless tobacco: Never   Substance Use Topics    Alcohol use: Yes    Drug use: Never     Review of Systems   Constitutional:  Negative for chills and fever.   HENT:  Negative for congestion and sore throat.    Respiratory:  Negative for cough and shortness of breath.    Cardiovascular:  Negative for chest pain and palpitations.   Gastrointestinal:  Negative for abdominal pain and nausea.   Genitourinary:  Negative for dysuria and hematuria.   Musculoskeletal:  Negative for arthralgias and myalgias.   Neurological:  Negative for dizziness and weakness.       Physical Exam     Initial Vitals [11/26/24 1606]   BP Pulse Resp Temp SpO2   (!) 179/99 76 16 98.2 °F (36.8 °C) 100 %      MAP       --         Physical Exam    Nursing note and vitals reviewed.  Constitutional: He appears well-developed and well-nourished.   HENT:   Head: Normocephalic and atraumatic.   Eyes: Conjunctivae are normal. Pupils are equal, round, and reactive to light.   Neck: Neck supple.    Normal range of motion.  Cardiovascular:  Normal rate and regular rhythm.           Pulmonary/Chest: Breath sounds normal. No respiratory distress.   Abdominal: Abdomen is soft. There is no abdominal tenderness.   Musculoskeletal:         General: No edema. Normal range of motion.      Cervical back: Normal range of motion and neck supple.     Neurological: He is alert and oriented to person, place, and time.   Skin: Skin is warm and dry.         ED Course   Procedures  Labs Reviewed   COMPREHENSIVE METABOLIC PANEL - Abnormal       Result Value    Sodium 137      Potassium 4.0      Chloride 101      CO2 30 (*)     Glucose 112 (*)     Blood Urea Nitrogen 31.8 (*)     Creatinine 1.23      Calcium 9.6      Protein Total 6.9      Albumin 4.4      Globulin 2.5      Albumin/Globulin Ratio 1.8      Bilirubin Total 0.7      ALP 51      ALT 28      AST 42 (*)     eGFR >60      Anion Gap 6.0      BUN/Creatinine Ratio 26     DRUG SCREEN, URINE (BEAKER) - Abnormal    Amphetamines, Urine Negative      Barbiturates, Urine Negative      Benzodiazepine, Urine Negative      Cannabinoids, Urine Positive (*)     Cocaine, Urine Negative      Fentanyl, Urine Negative      MDMA, Urine Negative      Opiates, Urine Negative      Phencyclidine, Urine Negative      pH, Urine 6.5      Specific Gravity, Urine Auto 1.021      Narrative:     Cut off concentrations:    Amphetamines - 1000 ng/ml  Barbiturates - 200 ng/ml  Benzodiazepine - 200 ng/ml  Cannabinoids (THC) - 50 ng/ml  Cocaine - 300 ng/ml  Fentanyl - 1.0 ng/ml  MDMA - 500 ng/ml  Opiates - 300 ng/ml   Phencyclidine (PCP) - 25 ng/ml    Specimen submitted for drug analysis and tested for pH and specific gravity in order to evaluate sample integrity. Suspect tampering if specific gravity is <1.003 and/or pH is not within the range of 4.5 - 8.0  False negatives may result form substances such as bleach added to urine.  False positives may result for the presence of a substance with similar  chemical structure to the drug or its metabolite.    This test provides only a PRELIMINARY analytical test result. A more specific alternate chemical method must be used in order to obtain a confirmed analytical result. Gas chromatography/mass spectrometry (GC/MS) is the preferred confirmatory method. Other chemical confirmation methods are available. Clinical consideration and professional judgement should be applied to any drug of abuse test result, particularly when preliminary positive results are used.    Positive results will be confirmed only at the physicians request. Unconfirmed screening results are to be used only for medical purposes (treatment).        ACETAMINOPHEN LEVEL - Abnormal    Acetaminophen Level <3.0 (*)    CBC WITH DIFFERENTIAL - Abnormal    WBC 8.95      RBC 5.05      Hgb 15.7      Hct 44.8      MCV 88.7      MCH 31.1 (*)     MCHC 35.0      RDW 11.8      Platelet 313      MPV 9.6      Neut % 69.3      Lymph % 18.0      Mono % 7.4      Eos % 4.2      Basophil % 0.9      Lymph # 1.61      Neut # 6.20      Mono # 0.66      Eos # 0.38      Baso # 0.08      IG# 0.02      IG% 0.2      NRBC% 0.0     TSH - Normal    TSH 0.362     URINALYSIS, REFLEX TO URINE CULTURE - Normal    Color, UA Light-Yellow      Appearance, UA Clear      Specific Gravity, UA 1.021      pH, UA 6.5      Protein, UA Negative      Glucose, UA Normal      Ketones, UA Negative      Blood, UA Negative      Bilirubin, UA Negative      Urobilinogen, UA Normal      Nitrites, UA Negative      Leukocyte Esterase, UA Negative      RBC, UA 0-5      WBC, UA None Seen      Bacteria, UA None Seen      Squamous Epithelial Cells, UA None Seen      Hyaline Casts, UA None Seen     ALCOHOL,MEDICAL (ETHANOL) - Normal    Ethanol Level <10.0     CBC W/ AUTO DIFFERENTIAL    Narrative:     The following orders were created for panel order CBC auto differential.  Procedure                               Abnormality         Status                      ---------                               -----------         ------                     CBC with Differential[[id number]]        Abnormal            Final result                 Please view results for these tests on the individual orders.   EXTRA TUBES    Narrative:     The following orders were created for panel order EXTRA TUBES.  Procedure                               Abnormality         Status                     ---------                               -----------         ------                     Gold Top Hold[[id number]]                                    Final result                 Please view results for these tests on the individual orders.   GOLD TOP HOLD    Extra Tube Hold for add-ons.            Imaging Results    None          Medications - No data to display  Medical Decision Making  PEC was placed.  CBC, CMP, blood tox panels are unremarkable.  Patient is medically stable for psychiatric admission.    Amount and/or Complexity of Data Reviewed  Labs: ordered. Decision-making details documented in ED Course.                  Medically cleared for psychiatry placement: 11/26/2024  6:12 PM                   Clinical Impression:  Final diagnoses:  [Z00.8] Medical clearance for psychiatric admission (Primary)  [R45.850] Homicidal ideation  [F23] Acute psychosis          ED Disposition Condition    Transfer to Psych Facility Stable          ED Prescriptions    None       Follow-up Information    None          Cristóbal Garcia MD  11/26/24 9013